# Patient Record
Sex: MALE | Race: BLACK OR AFRICAN AMERICAN | NOT HISPANIC OR LATINO | Employment: FULL TIME | URBAN - METROPOLITAN AREA
[De-identification: names, ages, dates, MRNs, and addresses within clinical notes are randomized per-mention and may not be internally consistent; named-entity substitution may affect disease eponyms.]

---

## 2019-07-11 ENCOUNTER — HOSPITAL ENCOUNTER (EMERGENCY)
Facility: HOSPITAL | Age: 39
Discharge: HOME | End: 2019-07-11
Attending: EMERGENCY MEDICINE | Admitting: EMERGENCY MEDICINE

## 2019-07-11 VITALS
RESPIRATION RATE: 16 BRPM | OXYGEN SATURATION: 99 % | HEIGHT: 69 IN | BODY MASS INDEX: 34.07 KG/M2 | HEART RATE: 73 BPM | SYSTOLIC BLOOD PRESSURE: 135 MMHG | DIASTOLIC BLOOD PRESSURE: 73 MMHG | WEIGHT: 230 LBS | TEMPERATURE: 98.7 F

## 2019-07-11 DIAGNOSIS — H60.92 OTITIS EXTERNA OF LEFT EAR, UNSPECIFIED CHRONICITY, UNSPECIFIED TYPE: Primary | ICD-10-CM

## 2019-07-11 PROCEDURE — 99282 EMERGENCY DEPT VISIT SF MDM: CPT

## 2019-07-11 RX ORDER — CIPROFLOXACIN 500 MG/1
500 TABLET, FILM COATED ORAL 2 TIMES DAILY
Qty: 14 TABLET | Refills: 0 | Status: SHIPPED | OUTPATIENT
Start: 2019-07-11

## 2019-07-11 NOTE — DISCHARGE INSTRUCTIONS
Continue using the antibiotic eardrops to your left ear as directed.  Use the Cipro prescription as directed.  Use over-the-counter Tylenol or ibuprofen as needed for pain.  Follow-up with Dr. Stevens if symptoms do not improve in the next 2 to 3 days.

## 2019-07-11 NOTE — ED PROVIDER NOTES
EMERGENCY DEPARTMENT ENCOUNTER    CHIEF COMPLAINT  Chief Complaint: Ear Pain  History given by: patient  History limited by: og  Room Number: 02/02  PMD: No primary care provider on file.      HPI:  Pt is a 39 y.o. male who presents complaining of worsening L ear pain that started Sunday. Pt admits to L ear swelling. 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 Pt reports that he went to the  on Tuesday and they prescribed him  Neomyxin-HC which he has taken for 2 days.    Duration:  5 days  Onset: gradual  Timing: constant  Location: L ear  Radiation: none  Quality: pain  Intensity/Severity: mild  Progression: worsening  Associated Symptoms:  L ear swelling  Aggravating Factors: none  Alleviating Factors: none  Previous Episodes: none  Treatment before arrival: Pt reports that he went to the  and they prescribed him  Neomyxin-hc    PAST MEDICAL HISTORY  Active Ambulatory Problems     Diagnosis Date Noted   • No Active Ambulatory Problems     Resolved Ambulatory Problems     Diagnosis Date Noted   • No Resolved Ambulatory Problems     No Additional Past Medical History       PAST SURGICAL HISTORY  No past surgical history on file.    FAMILY HISTORY  History reviewed. No pertinent family history.    SOCIAL HISTORY       ALLERGIES  Patient has no known allergies.    REVIEW OF SYSTEMS  Review of Systems   Constitutional: Negative for activity change, appetite change and fever.   HENT: Positive for ear pain (L ear) and facial swelling (L ear). Negative for congestion and sore throat.    Eyes: Negative.    Respiratory: Negative for cough and shortness of breath.    Cardiovascular: Negative for chest pain and leg swelling.   Gastrointestinal: Negative for abdominal pain, diarrhea and vomiting.   Endocrine: Negative.    Genitourinary: Negative for decreased urine volume and dysuria.   Musculoskeletal: Negative for neck pain.   Skin: Negative for rash and wound.    Allergic/Immunologic: Negative.    Neurological: Negative for weakness, numbness and headaches.   Hematological: Negative.    Psychiatric/Behavioral: Negative.    All other systems reviewed and are negative.      PHYSICAL EXAM  ED Triage Vitals   Temp Heart Rate Resp BP SpO2   07/11/19 1353 07/11/19 1353 07/11/19 1353 07/11/19 1446 07/11/19 1353   98.7 °F (37.1 °C) 73 16 135/73 99 %      Temp src Heart Rate Source Patient Position BP Location FiO2 (%)   07/11/19 1353 -- 07/11/19 1446 07/11/19 1446 --   Tympanic  Lying Left arm          Physical Exam   Constitutional: He is oriented to person, place, and time. No distress.   HENT:   Head: Normocephalic and atraumatic.   L ear: The pinna is mildly inflamed, erythematous, and swollen. The ear canal is edematous and the ear drum is not visualized due to the swelling   Eyes: EOM are normal. Pupils are equal, round, and reactive to light.   Neck: Normal range of motion. Neck supple.   Cardiovascular: Normal rate, regular rhythm and normal heart sounds.   Pulmonary/Chest: Effort normal and breath sounds normal. No respiratory distress.   Abdominal: Soft. There is no tenderness. There is no rebound and no guarding.   Musculoskeletal: Normal range of motion. He exhibits no edema.   Lymphadenopathy:     He has no cervical adenopathy.   Neurological: He is alert and oriented to person, place, and time. He has normal sensation and normal strength.   Skin: Skin is warm and dry.   Psychiatric: Mood and affect normal.   Nursing note and vitals reviewed.        PROGRESS AND CONSULTS     1444- An ear wick was placed in the L ear so the pt can put his ear drops in appropriately. D/w pt the plan to DC. D/w pt the plan to leave the ear wick in until the inflammation can be resolved. Pt understands and agrees with the plan, all questions answered.        MEDICATIONS GIVEN IN ER  Medications - No data to display        MEDICAL DECISION MAKING  Results were reviewed/discussed with the  patient and they were also made aware of online access. Pt also made aware that some labs, such as cultures, will not be resulted during ER visit and follow up with PMD is necessary.     MDM       DIAGNOSIS  Final diagnoses:   Otitis externa of left ear, unspecified chronicity, unspecified type       DISPOSITION  DISCHARGE    Patient discharged in stable condition.    Reviewed implications of results, diagnosis, meds, responsibility to follow up, warning signs and symptoms of possible worsening, potential complications and reasons to return to ER.    Patient/Family voiced understanding of above instructions.    Discussed plan for discharge, as there is no emergent indication for admission. Patient referred to primary care provider for BP management due to today's BP. Pt/family is agreeable and understands need for follow up and repeat testing.  Pt is aware that discharge does not mean that nothing is wrong but it indicates no emergency is present that requires admission and they must continue care with follow-up as given below or physician of their choice.     FOLLOW-UP  Elton Stevens MD  [address] [address]  [phone]      If symptoms worsen         Medication List      New Prescriptions    ciprofloxacin 500 MG tablet  Commonly known as:  CIPRO  Take 1 tablet by mouth 2 (Two) Times a Day.              Latest Documented Vital Signs:  As of 3:07 PM  BP- 135/73 HR- 73 Temp- 98.7 °F (37.1 °C) (Tympanic) O2 sat- 99%    --  Documentation assistance provided by babar Enriquez for Dr. Hines.  Information recorded by the scribe was done at my direction and has been verified and validated by me.       Patricia Enriquez  07/11/19 3684       Lexx Hines MD  07/11/19 [id number]